# Patient Record
Sex: MALE | Race: WHITE
[De-identification: names, ages, dates, MRNs, and addresses within clinical notes are randomized per-mention and may not be internally consistent; named-entity substitution may affect disease eponyms.]

---

## 2017-08-03 ENCOUNTER — HOSPITAL ENCOUNTER (OUTPATIENT)
Dept: HOSPITAL 47 - RADXRMAIN | Age: 47
Discharge: HOME | End: 2017-08-03
Payer: COMMERCIAL

## 2017-08-03 DIAGNOSIS — M94.0: Primary | ICD-10-CM

## 2017-08-03 PROCEDURE — 71020: CPT

## 2017-08-03 NOTE — XR
EXAMINATION TYPE: XR chest 2V

 

DATE OF EXAM: 8/3/2017

 

COMPARISON: NONE

 

HISTORY: Chest pain

 

TECHNIQUE:  Frontal and lateral views of the chest are obtained.

 

FINDINGS:  Heart and mediastinum are normal. Lungs are clear. Diaphragm is normal. Bony thorax is int
act.

 

IMPRESSION:  Normal chest

## 2017-09-22 ENCOUNTER — HOSPITAL ENCOUNTER (OUTPATIENT)
Dept: HOSPITAL 47 - RADNMMAIN | Age: 47
End: 2017-09-22
Payer: COMMERCIAL

## 2017-09-22 DIAGNOSIS — M41.24: ICD-10-CM

## 2017-09-22 DIAGNOSIS — M47.24: ICD-10-CM

## 2017-09-22 DIAGNOSIS — M47.22: Primary | ICD-10-CM

## 2017-09-22 PROCEDURE — 78306 BONE IMAGING WHOLE BODY: CPT

## 2017-09-22 NOTE — NM
EXAMINATION TYPE: NM bone scan whole body

 

DATE OF EXAM: 9/22/2017

 

COMPARISON: Thoracic spine from outside institution 9/18/2017

 

HISTORY: Idiopathic scoliosis

 

Delayed whole-body scanning was performed following the injection of 26.5 mCi Tc 99m MDP.  Images acq
uired 3.5 hours post injection.

 

FINDINGS: Whole-body scanning as well as spot images obtained.

 

Spinal curvature is noted as on plain film. Soft tissue uptake is normal. Mild uptake within the knee
s, shoulders, sternoclavicular joints, ankles and feet likely due to mild degenerative change. Uptake
 in the maxilla and mandible likely due to periodontal disease.

 

IMPRESSION: 

 

Findings compatible with patient's history.

## 2018-05-11 ENCOUNTER — HOSPITAL ENCOUNTER (EMERGENCY)
Dept: HOSPITAL 47 - EC | Age: 48
Discharge: HOME | End: 2018-05-11
Payer: COMMERCIAL

## 2018-05-11 VITALS
RESPIRATION RATE: 22 BRPM | SYSTOLIC BLOOD PRESSURE: 117 MMHG | TEMPERATURE: 98.1 F | DIASTOLIC BLOOD PRESSURE: 87 MMHG | HEART RATE: 68 BPM

## 2018-05-11 DIAGNOSIS — K64.8: Primary | ICD-10-CM

## 2018-05-11 PROCEDURE — 99282 EMERGENCY DEPT VISIT SF MDM: CPT

## 2018-05-11 NOTE — ED
General Adult HPI





- General


Chief complaint: Recheck/Abnormal Lab/Rx


Stated complaint: Hemrroids


Time Seen by Provider: 05/11/18 13:42


Source: patient, RN notes reviewed


Mode of arrival: ambulatory


Limitations: no limitations





- History of Present Illness


Initial comments: 





Patient 48-year-old male presents into the emergency room today with chief 

complaint of hemorrhoids.  Patient states that he has been to his hemorrhoids 

off and on since the age of 29.  He does admit that he went to a surgeon 

earlier in the week and had a hemorrhoid incised.  He does admit that he was 

feeling well for a few days but the last 2 days but have increased pain.  He 

does admit that there is still been draining some bloody is been using a pad.  

He doesn't pain is worse with movements.  Patient states he began taking a 

stool softener over-the-counter.  He has not been using anything topical.  He 

states been using pain medication.  He denies any other complaints.  Patient 

was given oxycodone from the surgeon's office.  Patient also admits that he's 

been using anti-inflammatories. 





- Related Data


 Previous Rx's











 Medication  Instructions  Recorded


 


Docusate [Colace] 100 mg PO DAILY #14 capsule 05/11/18


 


Hydrocortisone [Anusol-Hc] 1 applic RECTAL TID #1 gm 05/11/18











 Allergies











Allergy/AdvReac Type Severity Reaction Status Date / Time


 


No Known Allergies Allergy   Verified 05/11/18 13:42














Review of Systems


ROS Statement: 


Those systems with pertinent positive or pertinent negative responses have been 

documented in the HPI.





ROS Other: All systems not noted in ROS Statement are negative.





Past Medical History


Additional Past Medical History / Comment(s): chronic back pain


History of Any Multi-Drug Resistant Organisms: None Reported


Past Surgical History: No Surgical Hx Reported


Past Psychological History: No Psychological Hx Reported


Smoking Status: Never smoker


Past Alcohol Use History: None Reported


Past Drug Use History: Marijuana





General Exam





- General Exam Comments


Initial Comments: 





General:  The patient is awake and alert, in no distress, and does not appear 

acutely ill. 


Eye:  Pupils are equal, round and reactive to light, extra-ocular movements are 

intact.  No nystagmus.  There is normal conjunctiva bilaterally.  No signs of 

icterus.  


Ears, nose, mouth and throat:  There are moist mucous membranes and no oral 

lesions. 


Neck:  The neck is supple


Musculoskeletal:  Normal ROM, no tenderness.  Strength 5/5. Sensation intact.


Neurological:  A&O x 3. CN II-XII intact, There are no obvious motor or sensory 

deficits. Coordination appears grossly intact. Speech is normal.


Skin:  Skin is warm and dry and no rashes or lesions are noted. 


Psychiatric:  Cooperative, appropriate mood & affect, normal judgment.   


: Patient does have 2 large nonthrombosed hemorrhoids.  One on the left and 

one on the right.  On the right side to his incision site with small amount of 

blood at the site no active bleeding. 


Limitations: no limitations





Course





 Vital Signs











  05/11/18





  13:38


 


Temperature 98.1 F


 


Pulse Rate 68


 


Respiratory 22





Rate 


 


Blood Pressure 117/87


 


O2 Sat by Pulse 100





Oximetry 














Medical Decision Making





- Medical Decision Making





Patient will be given lidocaine.  The emergency room.  Given a prescription for 

topical cream.  Advised continue a stool softener.  Advised pain medication.  

Advised follow-up with surgeon next 2 days.  Advised return for any other 

concerns.





Disposition


Clinical Impression: 


 Hemorrhoid





Disposition: HOME SELF-CARE


Condition: Good


Instructions:  Hemorrhoids (ED)


Additional Instructions: 


Please use medication as discussed.  Please follow-up with surgeon in the next 

2 days.  Please return to emergency room if the symptoms increase or worsen or 

for any other concerns.


Prescriptions: 


Docusate [Colace] 100 mg PO DAILY #14 capsule


Hydrocortisone [Anusol-Hc] 1 applic RECTAL TID #1 gm


Is patient prescribed a controlled substance at d/c from ED?: No


Referrals: 


Lesly Witt DO [Primary Care Provider] - 1-2 days


Time of Disposition: 14:12

## 2021-09-11 ENCOUNTER — HOSPITAL ENCOUNTER (EMERGENCY)
Dept: HOSPITAL 47 - EC | Age: 51
Discharge: HOME | End: 2021-09-11
Payer: COMMERCIAL

## 2021-09-11 VITALS
TEMPERATURE: 98 F | DIASTOLIC BLOOD PRESSURE: 76 MMHG | RESPIRATION RATE: 20 BRPM | HEART RATE: 65 BPM | SYSTOLIC BLOOD PRESSURE: 129 MMHG
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
## 2021-09-11 DIAGNOSIS — H20.9: ICD-10-CM
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
## 2021-09-11 DIAGNOSIS — Z87.891: ICD-10-CM

## 2021-09-11 DIAGNOSIS — T15.92XA: Primary | ICD-10-CM

## 2021-09-11 PROCEDURE — 99283 EMERGENCY DEPT VISIT LOW MDM: CPT

## 2021-09-11 NOTE — ED
General Adult HPI





- General


Chief complaint: Eye Problems


Stated complaint: L eye irritation


Time Seen by Provider: 09/11/21 22:00


Source: patient, RN notes reviewed


Mode of arrival: ambulatory


Limitations: no limitations





- History of Present Illness


Initial comments: 





51-year-old male patient, alert and oriented 4, presents to emergency room with

complaints of left eye pain.  Patient states on Tuesday he was using a metal 

 and states got  a piece of metal in his left eye.  States that he 

thought it was just a scratch and would heal on its own as he has had corneal 

abrasions in the past.  He states that today he woke up and it was crusted shut 

and is having increased pain with light.  He states that even with his left eye 

closed the light in the right eye causes left eye pain.  Denies any fevers.  No 

nausea vomiting.


-: days(s) (5)


Severity scale (1-10): 10


Quality: aching, constant


Improves with: none


Worsens with: other (light)


Associated Symptoms: denies other symptoms





- Related Data


                                Home Medications











 Medication  Instructions  Recorded  Confirmed


 


Cyclobenzaprine [Flexeril] 10 mg PO DAILY PRN 05/11/18 05/11/18


 


Docusate [Colace] 100 mg PO ONCE PRN 05/11/18 05/11/18


 


Ibuprofen [Motrin] 800 mg PO TID PRN 05/11/18 05/11/18


 


oxyCODONE-APAP 7.5-325MG [Percocet 1 tab PO Q6HR PRN 05/11/18 05/11/18





7.5-325 mg]   


 


traMADol HCl [Ultram] 50 mg PO Q12H PRN 05/11/18 05/11/18








                                  Previous Rx's











 Medication  Instructions  Recorded


 


Docusate [Colace] 100 mg PO DAILY #14 capsule 05/11/18


 


Hydrocortisone [Anusol-Hc] 1 applic RECTAL TID #1 gm 05/11/18


 


Ciprofloxacin Ophth Soln [Ciloxan 2 drops LEFT EYE Q4HR 5 Days #2.5 09/11/21





0.3% Ophth Soln] ml 


 


Cyclopentolate 1% Ophth Soln 1 drops LEFT EYE DAILY 3 Days #1 ml 09/11/21





[Cyclogyl 1% Ophth Soln]  











                                    Allergies











Allergy/AdvReac Type Severity Reaction Status Date / Time


 


No Known Allergies Allergy   Verified 09/11/21 21:55














Review of Systems


ROS Statement: 


Those systems with pertinent positive or pertinent negative responses have been 

documented in the HPI.





ROS Other: All systems not noted in ROS Statement are negative.





Past Medical History


Additional Past Medical History / Comment(s): chronic back pain


History of Any Multi-Drug Resistant Organisms: None Reported


Past Surgical History: Orthopedic Surgery


Additional Past Surgical History / Comment(s): rt knee


Past Psychological History: No Psychological Hx Reported


Smoking Status: Former smoker


Past Alcohol Use History: None Reported


Past Drug Use History: Marijuana





General Exam


Limitations: no limitations


General appearance: alert, in no apparent distress


Head exam: Present: atraumatic, normocephalic, normal inspection


Eye exam: Present: PERRL, EOMI, conjunctival injection.  Absent: nystagmus


Pupils: Present: normal accommodation, other (Foreign body at 9:00)


ENT exam: Present: normal exam, normal oropharynx, mucous membranes moist


Neck exam: Present: normal inspection, full ROM.  Absent: tenderness, 

meningismus, lymphadenopathy


Respiratory exam: Present: normal lung sounds bilaterally.  Absent: respiratory 

distress, wheezes, rales, rhonchi, stridor


Cardiovascular Exam: Present: regular rate, normal rhythm, normal heart sounds. 

Absent: systolic murmur, diastolic murmur, rubs, gallop, clicks


GI/Abdominal exam: Present: soft, normal bowel sounds.  Absent: distended, 

tenderness, guarding, rebound, rigid


Back exam: Present: normal inspection


Neurological exam: Present: alert, oriented X3, CN II-XII intact


Psychiatric exam: Present: normal affect, normal mood


Skin exam: Present: warm, dry, intact, normal color.  Absent: rash, cyanosis, 

diaphoretic





Course


                                   Vital Signs











  09/11/21





  21:52


 


Temperature 98.0 F


 


Pulse Rate 65


 


Respiratory 20





Rate 


 


Blood Pressure 129/76


 


O2 Sat by Pulse 99





Oximetry 














Procedures





- Forgein Body Removal Eye


Site: Left


Anesthetic Used: Proparacaine


Eye Exam Technique: Woods Lamp, Fluorescein


Foreign Body Suspected: Metal


Forgein Body Removal Technique: Cotton Swab, [place]


Patient Tolerated: well


Additional Comments: 





rust ring remains at 9:00, unable to remove will be referred to ophthalmology





Medical Decision Making





- Medical Decision Making





Patient presents with a metal foreign body to his left eye since Tuesday.  

Foreign body was removed and multiple attempts to remove the rust ring were made

and unsuccessful.  Case discussed with Dr Selby and he'll be put on 

Cyclopentolate and opthalmic antibiotics.  He will be referred to ophthalmology.

Directed to return to the emergency room with any new or worsening symptoms 

including increased pain, fevers, pain with eye movement or visual changes.





Disposition


Clinical Impression: 


 Foreign body in eye, Iritis





Disposition: HOME SELF-CARE


Condition: Good


Instructions (If sedation given, give patient instructions):  Eye Foreign Body 

(ED)


Additional Instructions: 


Use medication as prescribed and follow-up with ophthalmology on Monday.  Return

to the emergency room with any new or worsening symptoms including pain, fever 

or visual changes.


Prescriptions: 


Ciprofloxacin Ophth Soln [Ciloxan 0.3% Ophth Soln] 2 drops LEFT EYE Q4HR 5 Days 

#2.5 ml


Cyclopentolate 1% Ophth Soln [Cyclogyl 1% Ophth Soln] 1 drops LEFT EYE DAILY 3 

Days #1 ml


Is patient prescribed a controlled substance at d/c from ED?: No


Referrals: 


None,Stated [Primary Care Provider] - 1-2 days


Cynthia Patterson MD [STAFF PHYSICIAN] - 1-2 days


Time of Disposition: 22:37